# Patient Record
Sex: FEMALE | Race: BLACK OR AFRICAN AMERICAN
[De-identification: names, ages, dates, MRNs, and addresses within clinical notes are randomized per-mention and may not be internally consistent; named-entity substitution may affect disease eponyms.]

---

## 2018-01-11 ENCOUNTER — HOSPITAL ENCOUNTER (OUTPATIENT)
Dept: HOSPITAL 62 - RAD | Age: 33
End: 2018-01-11
Attending: CLINIC/CENTER
Payer: MEDICARE

## 2018-01-11 DIAGNOSIS — M54.5: Primary | ICD-10-CM

## 2018-01-11 PROCEDURE — 72220 X-RAY EXAM SACRUM TAILBONE: CPT

## 2018-01-11 NOTE — RADIOLOGY REPORT (SQ)
EXAM DESCRIPTION:  SACRUM AND COCCYX



COMPLETED DATE/TIME:  1/11/2018 4:26 pm



REASON FOR STUDY:  FALL WITH PAIN ROM ISSUES LOW BACK



COMPARISON:  None.



NUMBER OF VIEWS:  Three views.



TECHNIQUE:  AP, lateral, and tilt views of the sacrum and coccyx.



LIMITATIONS:  None.



FINDINGS:  MINERALIZATION: Normal.

BONES: On the lateral view, there is an acute fracture through the distal sacrum near the sacrococcyg
eal junction.  This is nondisplaced nonangulated.

SOFT TISSUES: No soft tissue swelling.  No foreign body.

OTHER: No other significant finding.



IMPRESSION:  Lateral view demonstrates an acute fracture through the distal sacrum near the sacrococc
ygeal junction.  This is non angulated, nondisplaced.



TECHNICAL DOCUMENTATION:  JOB ID:  [id number]

 2011 [employer]- All Rights Reserved

## 2018-03-26 ENCOUNTER — HOSPITAL ENCOUNTER (OUTPATIENT)
Dept: HOSPITAL 62 - RAD | Age: 33
End: 2018-03-26
Attending: CLINIC/CENTER
Payer: MEDICARE

## 2018-03-26 DIAGNOSIS — R05: ICD-10-CM

## 2018-03-26 DIAGNOSIS — J32.9: Primary | ICD-10-CM

## 2018-03-26 DIAGNOSIS — J40: ICD-10-CM

## 2018-03-26 PROCEDURE — 70220 X-RAY EXAM OF SINUSES: CPT

## 2018-03-26 PROCEDURE — 71046 X-RAY EXAM CHEST 2 VIEWS: CPT

## 2018-03-26 NOTE — RADIOLOGY REPORT (SQ)
EXAM DESCRIPTION:  CHEST PA/LAT



COMPLETED DATE/TIME:  3/26/2018 12:32 pm



REASON FOR STUDY:  PERSISTENT COUGH/RHINOSINUSITIS/BRONCHITIS



COMPARISON:  None.



EXAM PARAMETERS:  NUMBER OF VIEWS: two views

TECHNIQUE: Digital Frontal and Lateral radiographic views of the chest acquired.

RADIATION DOSE: NA

LIMITATIONS: none



FINDINGS:  LUNGS AND PLEURA: No opacities, masses or pneumothorax. No pleural effusion.

MEDIASTINUM AND HILAR STRUCTURES: No masses or contour abnormalities.

HEART AND VASCULAR STRUCTURES: Heart normal size.  No evidence for failure.

BONES: No acute findings.

HARDWARE: None in the chest.

OTHER: No other significant finding.



IMPRESSION:  NO SIGNIFICANT RADIOGRAPHIC FINDING IN THE CHEST.



TECHNICAL DOCUMENTATION:  JOB ID:  [id number]

 2011 [employer]- All Rights Reserved



Reading location - IP/workstation name: [email or web address]

## 2018-03-26 NOTE — RADIOLOGY REPORT (SQ)
EXAM DESCRIPTION:  PARANASAL SINUSES



COMPLETED DATE/TIME:  3/26/2018 12:32 pm



REASON FOR STUDY:  RHINOSINUSITIS



COMPARISON:  None.



NUMBER OF VIEWS:  Three views.



TECHNIQUE:  Images of the paranasal sinuses acquired.



LIMITATIONS:  None.



FINDINGS:  ORBITS: No fracture. No foreign body.

SINUSES: No mucosal thickening. No air fluid levels.

FACIAL BONES: No fracture.

OTHER: No other significant finding.



IMPRESSION:  NO FOREIGN BODY OR FRACTURE.  NO PLAIN RADIOGRAPHIC EVIDENCE FOR SINUS DISEASE.



TECHNICAL DOCUMENTATION:  JOB ID:  [id number]

 2011 [employer]- All Rights Reserved



Reading location - IP/workstation name: [email or web address]

## 2018-11-19 ENCOUNTER — HOSPITAL ENCOUNTER (OUTPATIENT)
Dept: HOSPITAL 62 - LC | Age: 33
Setting detail: OBSERVATION
LOS: 1 days | Discharge: HOME | End: 2018-11-20
Attending: OBSTETRICS & GYNECOLOGY | Admitting: OBSTETRICS & GYNECOLOGY
Payer: COMMERCIAL

## 2018-11-19 DIAGNOSIS — Z87.891: ICD-10-CM

## 2018-11-19 DIAGNOSIS — Z04.1: Primary | ICD-10-CM

## 2018-11-19 DIAGNOSIS — O47.03: ICD-10-CM

## 2018-11-19 DIAGNOSIS — Z3A.35: ICD-10-CM

## 2018-11-19 LAB
APPEARANCE UR: CLEAR
APTT PPP: YELLOW S
BARBITURATES UR QL SCN: NEGATIVE
BILIRUB UR QL STRIP: NEGATIVE
GLUCOSE UR STRIP-MCNC: NEGATIVE MG/DL
KETONES UR STRIP-MCNC: NEGATIVE MG/DL
METHADONE UR QL SCN: NEGATIVE
NITRITE UR QL STRIP: NEGATIVE
PCP UR QL SCN: NEGATIVE
PH UR STRIP: 6 [PH] (ref 5–9)
PROT UR STRIP-MCNC: NEGATIVE MG/DL
SP GR UR STRIP: 1.01
URINE AMPHETAMINES SCREEN: NEGATIVE
URINE BENZODIAZEPINES SCREEN: NEGATIVE
URINE COCAINE SCREEN: NEGATIVE
URINE MARIJUANA (THC) SCREEN: (no result)
UROBILINOGEN UR-MCNC: NEGATIVE MG/DL (ref ?–2)

## 2018-11-19 PROCEDURE — 81001 URINALYSIS AUTO W/SCOPE: CPT

## 2018-11-19 PROCEDURE — 4A0HXCZ MEASUREMENT OF PRODUCTS OF CONCEPTION, CARDIAC RATE, EXTERNAL APPROACH: ICD-10-PCS | Performed by: OBSTETRICS & GYNECOLOGY

## 2018-11-19 PROCEDURE — 80307 DRUG TEST PRSMV CHEM ANLYZR: CPT

## 2018-11-19 PROCEDURE — 85025 COMPLETE CBC W/AUTO DIFF WBC: CPT

## 2018-11-19 PROCEDURE — 59025 FETAL NON-STRESS TEST: CPT

## 2018-11-19 PROCEDURE — 86900 BLOOD TYPING SEROLOGIC ABO: CPT

## 2018-11-19 PROCEDURE — 36415 COLL VENOUS BLD VENIPUNCTURE: CPT

## 2018-11-19 PROCEDURE — 84112 EVAL AMNIOTIC FLUID PROTEIN: CPT

## 2018-11-19 PROCEDURE — 86901 BLOOD TYPING SEROLOGIC RH(D): CPT

## 2018-11-19 PROCEDURE — 86850 RBC ANTIBODY SCREEN: CPT

## 2018-11-19 PROCEDURE — 86592 SYPHILIS TEST NON-TREP QUAL: CPT

## 2018-11-20 LAB
ABSOLUTE LYMPHOCYTES# (MANUAL): 1.8 10^3/UL (ref 0.5–4.7)
ABSOLUTE MONOCYTES # (MANUAL): 0.4 10^3/UL (ref 0.1–1.4)
ABSOLUTE NEUTROPHILS# (MANUAL): 7.9 10^3/UL (ref 1.7–8.2)
ACANTHOCYTES BLD QL SMEAR: (no result)
ADD MANUAL DIFF: YES
BASOPHILS NFR BLD MANUAL: 0 % (ref 0–2)
EOSINOPHIL NFR BLD MANUAL: 0 % (ref 0–6)
ERYTHROCYTE [DISTWIDTH] IN BLOOD BY AUTOMATED COUNT: 13.9 % (ref 11.5–14)
HCT VFR BLD CALC: 28.6 % (ref 36–47)
HELMET CELLS BLD QL SMEAR: SLIGHT
HGB BLD-MCNC: 9.9 G/DL (ref 12–15.5)
MCH RBC QN AUTO: 29.6 PG (ref 27–33.4)
MCHC RBC AUTO-ENTMCNC: 34.4 G/DL (ref 32–36)
MCV RBC AUTO: 86 FL (ref 80–97)
MONOCYTES % (MANUAL): 4 % (ref 3–13)
OVALOCYTES BLD QL SMEAR: (no result)
PLATELET # BLD: 186 10^3/UL (ref 150–450)
PLATELET COMMENT: ADEQUATE
POIKILOCYTOSIS BLD QL SMEAR: (no result)
RBC # BLD AUTO: 3.33 10^6/UL (ref 3.72–5.28)
SEGMENTED NEUTROPHILS % (MAN): 78 % (ref 42–78)
TOTAL CELLS COUNTED BLD: 100
TOXIC GRANULES BLD QL SMEAR: SLIGHT
VARIANT LYMPHS NFR BLD MANUAL: 17 % (ref 13–45)
WBC # BLD AUTO: 10.1 10^3/UL (ref 4–10.5)
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
## 2018-11-27 ENCOUNTER — HOSPITAL ENCOUNTER (INPATIENT)
Dept: HOSPITAL 62 - LC | Age: 33
LOS: 2 days | Discharge: HOME | End: 2018-11-29
Attending: OBSTETRICS & GYNECOLOGY | Admitting: OBSTETRICS & GYNECOLOGY
Payer: MEDICARE

## 2018-11-27 DIAGNOSIS — Z23: ICD-10-CM

## 2018-11-27 DIAGNOSIS — Z3A.36: ICD-10-CM

## 2018-11-27 DIAGNOSIS — O45.8X3: ICD-10-CM

## 2018-11-27 DIAGNOSIS — D62: ICD-10-CM

## 2018-11-27 DIAGNOSIS — Z87.891: ICD-10-CM

## 2018-11-27 DIAGNOSIS — F12.90: ICD-10-CM

## 2018-11-27 LAB
APPEARANCE UR: CLEAR
APTT PPP: (no result) S
BARBITURATES UR QL SCN: NEGATIVE
BILIRUB UR QL STRIP: NEGATIVE
ERYTHROCYTE [DISTWIDTH] IN BLOOD BY AUTOMATED COUNT: 14.1 % (ref 11.5–14)
ERYTHROCYTE [DISTWIDTH] IN BLOOD BY AUTOMATED COUNT: 14.2 % (ref 11.5–14)
ERYTHROCYTE [DISTWIDTH] IN BLOOD BY AUTOMATED COUNT: 14.2 % (ref 11.5–14)
GLUCOSE UR STRIP-MCNC: NEGATIVE MG/DL
HCT VFR BLD CALC: 17.4 % (ref 36–47)
HCT VFR BLD CALC: 17.9 % (ref 36–47)
HCT VFR BLD CALC: 32.8 % (ref 36–47)
HGB BLD-MCNC: 11 G/DL (ref 12–15.5)
HGB BLD-MCNC: 5.8 G/DL (ref 12–15.5)
HGB BLD-MCNC: 6.1 G/DL (ref 12–15.5)
KETONES UR STRIP-MCNC: (no result) MG/DL
MCH RBC QN AUTO: 29.1 PG (ref 27–33.4)
MCH RBC QN AUTO: 29.3 PG (ref 27–33.4)
MCH RBC QN AUTO: 29.3 PG (ref 27–33.4)
MCHC RBC AUTO-ENTMCNC: 33.5 G/DL (ref 32–36)
MCHC RBC AUTO-ENTMCNC: 33.6 G/DL (ref 32–36)
MCHC RBC AUTO-ENTMCNC: 34.1 G/DL (ref 32–36)
MCV RBC AUTO: 86 FL (ref 80–97)
MCV RBC AUTO: 87 FL (ref 80–97)
MCV RBC AUTO: 87 FL (ref 80–97)
METHADONE UR QL SCN: NEGATIVE
NITRITE UR QL STRIP: NEGATIVE
PCP UR QL SCN: NEGATIVE
PH UR STRIP: 6 [PH] (ref 5–9)
PLATELET # BLD: 139 10^3/UL (ref 150–450)
PLATELET # BLD: 162 10^3/UL (ref 150–450)
PLATELET # BLD: 221 10^3/UL (ref 150–450)
PROT UR STRIP-MCNC: NEGATIVE MG/DL
RBC # BLD AUTO: 1.99 10^6/UL (ref 3.72–5.28)
RBC # BLD AUTO: 2.09 10^6/UL (ref 3.72–5.28)
RBC # BLD AUTO: 3.78 10^6/UL (ref 3.72–5.28)
SP GR UR STRIP: 1.02
URINE AMPHETAMINES SCREEN: NEGATIVE
URINE BENZODIAZEPINES SCREEN: NEGATIVE
URINE COCAINE SCREEN: NEGATIVE
URINE MARIJUANA (THC) SCREEN: (no result)
UROBILINOGEN UR-MCNC: NEGATIVE MG/DL (ref ?–2)
WBC # BLD AUTO: 16.9 10^3/UL (ref 4–10.5)
WBC # BLD AUTO: 16.9 10^3/UL (ref 4–10.5)
WBC # BLD AUTO: 17.1 10^3/UL (ref 4–10.5)

## 2018-11-27 PROCEDURE — P9016 RBC LEUKOCYTES REDUCED: HCPCS

## 2018-11-27 PROCEDURE — 86920 COMPATIBILITY TEST SPIN: CPT

## 2018-11-27 PROCEDURE — 90715 TDAP VACCINE 7 YRS/> IM: CPT

## 2018-11-27 PROCEDURE — G0008 ADMIN INFLUENZA VIRUS VAC: HCPCS

## 2018-11-27 PROCEDURE — 80349 CANNABINOIDS NATURAL: CPT

## 2018-11-27 PROCEDURE — 36430 TRANSFUSION BLD/BLD COMPNT: CPT

## 2018-11-27 PROCEDURE — 90471 IMMUNIZATION ADMIN: CPT

## 2018-11-27 PROCEDURE — 86901 BLOOD TYPING SEROLOGIC RH(D): CPT

## 2018-11-27 PROCEDURE — 80307 DRUG TEST PRSMV CHEM ANLYZR: CPT

## 2018-11-27 PROCEDURE — 90686 IIV4 VACC NO PRSV 0.5 ML IM: CPT

## 2018-11-27 PROCEDURE — 30233N1 TRANSFUSION OF NONAUTOLOGOUS RED BLOOD CELLS INTO PERIPHERAL VEIN, PERCUTANEOUS APPROACH: ICD-10-PCS | Performed by: OBSTETRICS & GYNECOLOGY

## 2018-11-27 PROCEDURE — 86592 SYPHILIS TEST NON-TREP QUAL: CPT

## 2018-11-27 PROCEDURE — 4A1HXCZ MONITORING OF PRODUCTS OF CONCEPTION, CARDIAC RATE, EXTERNAL APPROACH: ICD-10-PCS | Performed by: OBSTETRICS & GYNECOLOGY

## 2018-11-27 PROCEDURE — G0480 DRUG TEST DEF 1-7 CLASSES: HCPCS

## 2018-11-27 PROCEDURE — 86850 RBC ANTIBODY SCREEN: CPT

## 2018-11-27 PROCEDURE — 81001 URINALYSIS AUTO W/SCOPE: CPT

## 2018-11-27 PROCEDURE — 87086 URINE CULTURE/COLONY COUNT: CPT

## 2018-11-27 PROCEDURE — 86900 BLOOD TYPING SEROLOGIC ABO: CPT

## 2018-11-27 PROCEDURE — 85027 COMPLETE CBC AUTOMATED: CPT

## 2018-11-27 PROCEDURE — 36415 COLL VENOUS BLD VENIPUNCTURE: CPT

## 2018-11-27 PROCEDURE — 87591 N.GONORRHOEAE DNA AMP PROB: CPT

## 2018-11-27 PROCEDURE — 87491 CHLMYD TRACH DNA AMP PROBE: CPT

## 2018-11-27 RX ADMIN — IBUPROFEN SCH: 800 TABLET, FILM COATED ORAL at 14:03

## 2018-11-27 RX ADMIN — FAMOTIDINE SCH MG: 20 TABLET, FILM COATED ORAL at 22:00

## 2018-11-27 RX ADMIN — IBUPROFEN SCH MG: 800 TABLET, FILM COATED ORAL at 14:02

## 2018-11-27 RX ADMIN — FERROUS SULFATE TAB 325 MG (65 MG ELEMENTAL FE) SCH MG: 325 (65 FE) TAB at 17:02

## 2018-11-27 RX ADMIN — FAMOTIDINE SCH: 20 TABLET, FILM COATED ORAL at 17:04

## 2018-11-27 RX ADMIN — IBUPROFEN SCH MG: 800 TABLET, FILM COATED ORAL at 21:59

## 2018-11-27 RX ADMIN — Medication SCH: at 17:05

## 2018-11-27 RX ADMIN — SENNOSIDES, DOCUSATE SODIUM SCH: 50; 8.6 TABLET, FILM COATED ORAL at 17:05

## 2018-11-27 RX ADMIN — DOCUSATE SODIUM SCH MG: 100 CAPSULE, LIQUID FILLED ORAL at 17:01

## 2018-11-27 RX ADMIN — DOCUSATE SODIUM SCH: 100 CAPSULE, LIQUID FILLED ORAL at 17:04

## 2018-11-27 RX ADMIN — FERROUS SULFATE TAB 325 MG (65 MG ELEMENTAL FE) SCH: 325 (65 FE) TAB at 17:04

## 2018-11-27 NOTE — DELIVERY SUMMARY
=================================================================

Del Sum A-C

=================================================================

Datetime Report Generated by CPN: 2018 07:31

   

   

=================================================================

DELIVERY PERSONNEL

=================================================================

   

DELIVERY PERSONNEL:  [id number]

Delivery Doctor::  Sandee Dukes MD

Labor and Delivery Nurse::  Monique Cano RN

Scrub Tech/CNA:  Toño Ahn CNA

Scrub Tech/CNA:  Olu Do CNA

Additional Personnel: :  Julianna Hill, RN

   

=================================================================

MATERNAL INFORMATION

=================================================================

   

Delivery Anesthesia:  None

Medications After Delivery:  Pitocin Drip 20 Units/1000ml NSS

Estimated  Blood Loss (ml):  300

Maternal Complications:  Precipitous Labor (<3hrs); Abruptio Placenta

Complication Details:  partial placental abruption

Provider Comments:  dark clotted blood with passage of placenta c/w a

   possible partial abruption

   

=================================================================

LABOR SUMMARY

=================================================================

   

EDC:  2018 00:00

No. Babies in Womb:  1

 Attempted:  No

Labor Anesthesia:  None

   

=================================================================

LABOR INFORMATION

=================================================================

   

Reason for Induction:  Not Applicable

Onset of Labor:  2018 03:15

Complete Dilatation:  2018 05:43

Oxytocin:  Augmentation

Group B Beta Strep:  unknown

Antibiotics # of Doses:  1

Antibiotics Time of Last Dose:  0316

Name of Antibiotic Given:  Penicillin

Steroids Given:  None

Reason Steroids Not Administered:  Not Applicable

   

=================================================================

MEMBRANES

=================================================================

   

Membranes Rupture Method:  Artificial

Rupture of Membranes:  2018 03:15 (Annotations: Per Dr. Dukes

   sve assessment )

Length of Rupture (hr):  2.52

Amniotic Fluid Color:  Clear

Amniotic Fluid Amount:  Small

Amniotic Fluid Odor:  Normal

   

=================================================================

STAGES OF LABOR

=================================================================

   

Stage 1 hr:  2

Stage 1 min:  28

Stage 2 hr:  0

Stage 2 min:  3

Stage 3 hr:  0

Stage 3 min:  4

Total Time in Labor hr:  2

Total Time in Labor min:  35

   

=================================================================

VAGINAL DELIVERY

=================================================================

   

Episiotomy:  None

Laceration #1:  None

Laceration Extension #1:  N/A

Laceration Repair:  Not Applicable

Sponge Count Correct:  N/A

Sharps Count Correct:  N/A

   

=================================================================

CSECTION DELIVERY

=================================================================

   

Primary Indication:  N/A

Secondary Indication:  N/A

CSection Incidence:  N/A

Labor:  N/A

Elective:  N/A

CSection Incision:  N/A

   

=================================================================

BABY A INFORMATION

=================================================================

   

Infant Delivery Date/Time:  2018 05:46

Method of Delivery:  Vaginal

Born in Route :  No

:  N/A

Forceps:  N/A

Vacuum Extraction:  N/A

Shoulder Dystocia :  No

   

=================================================================

PRESENTATION/POSITION BABY A

=================================================================

   

Presentation:  Cephalic

Cephalic Presentation:  Vertex

Vertex Position:  Right Occipital Anterior

Breech Presentation:  N/A

   

=================================================================

PLACENTA INFORMATION BABY A

=================================================================

   

Placenta Delivery Time :  2018 05:50

Placenta Method of Delivery:  Spontaneous

Placenta Status:  Delivered

   

=================================================================

APGAR SCORES BABY A

=================================================================

   

Heart Rate 1 min:  >100 bpm

Resp Effort 1 min:  Good Cry

Reflex Irritability 1 min:  Cough or Sneeze or Pulls Away

Muscle Tone 1 min:  Active Motion

Color 1 min:  Blue/Pale

Resuscitation Effort 1 min:  Tactile Stimulation

APGAR SCORE 1 MIN:  8

Heart Rate 5 min:  >100 bpm

Resp Effort 5 min:  Good Cry

Reflex Irritability 5 min:  Cough or Sneeze or Pulls Away

Muscle Tone 5 min:  Active Motion

Color 5 min:  Body Pink, Extremities Blue

APGAR SCORE 5 MIN:  9

   

=================================================================

INFANT INFORMATION BABY A

=================================================================

   

Gestational Age at Delivery:  36.5

Gestational Status:  Late - 34- 36.6 Weeks

Infant Outcome :  Liveborn

Infant Condition :  Stable

Infant Sex:  Male

   

=================================================================

IDENTIFICATION BABY A

=================================================================

   

Infant Verification Date/Time:  2018 06:00

ID Band Number:  [id number]

Mother's Name Verified:  Yes

Infant Medical Record Number:  [id number]

RN Verifying Infant:  MGraham Cano RN

Additional Verifying Personnel:  RGraham Ahn CNA

   

=================================================================

WEIGHT/LENGTH BABY A

=================================================================

   

Infant Birthweight (gm):  2520

Infant Weight (lb):  5

Infant Weight (oz):  9

Infant Length (in):  18.50

Infant Length (cm):  46.99

   

=================================================================

CORD INFORMATION BABY A

=================================================================

   

No. Cord Vessels:  3

Nuchal Cord :  N/A

Cord Blood Taken:  Yes-For Storage (Mom's Blood type +)

Infant Suction:  None

   

=================================================================

ASSESSMENT BABY A

=================================================================

   

Infant Complications:  Multiple Variable Decels

Physical Findings at Delivery:  Within Normal Limits

Infant Respirations:  Appears Normal

Skin to Skin:  No

Neonatologist/ALS Called :  No

Infant Care By:  MARIANNE Medrano RN

Transferred To:  Remains with Mother

   

=================================================================

BABY B INFORMATION

=================================================================

   

 :  N/A

   

=================================================================

SIGNATURES

=================================================================

   

Signature:  Electronically signed by Sandee Dukes MD (ANDDO) on

   2018 at 05:55  with User ID: [email or web address]

## 2018-11-27 NOTE — WARNING SIGNS IN BABIES
=================================================================

VOD Warning Signs

=================================================================

Datetime Report Generated by N: 11/27/2018 06:35

   

VOD#608 -Warning Signs in Babies:  Viewed with Parent(s)/Family   

   (11/27/2018 06:20:Monique Cano RN)
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
## 2018-11-28 LAB
ERYTHROCYTE [DISTWIDTH] IN BLOOD BY AUTOMATED COUNT: 14.1 % (ref 11.5–14)
HCT VFR BLD CALC: 23.8 % (ref 36–47)
HGB BLD-MCNC: 8 G/DL (ref 12–15.5)
MCH RBC QN AUTO: 28.7 PG (ref 27–33.4)
MCHC RBC AUTO-ENTMCNC: 33.7 G/DL (ref 32–36)
MCV RBC AUTO: 85 FL (ref 80–97)
PLATELET # BLD: 127 10^3/UL (ref 150–450)
RBC # BLD AUTO: 2.79 10^6/UL (ref 3.72–5.28)
WBC # BLD AUTO: 13.2 10^3/UL (ref 4–10.5)

## 2018-11-28 PROCEDURE — 30233N1 TRANSFUSION OF NONAUTOLOGOUS RED BLOOD CELLS INTO PERIPHERAL VEIN, PERCUTANEOUS APPROACH: ICD-10-PCS | Performed by: OBSTETRICS & GYNECOLOGY

## 2018-11-28 RX ADMIN — FAMOTIDINE SCH MG: 20 TABLET, FILM COATED ORAL at 10:08

## 2018-11-28 RX ADMIN — IBUPROFEN SCH MG: 800 TABLET, FILM COATED ORAL at 21:23

## 2018-11-28 RX ADMIN — DOCUSATE SODIUM SCH MG: 100 CAPSULE, LIQUID FILLED ORAL at 18:14

## 2018-11-28 RX ADMIN — IBUPROFEN SCH: 800 TABLET, FILM COATED ORAL at 14:40

## 2018-11-28 RX ADMIN — IBUPROFEN SCH MG: 800 TABLET, FILM COATED ORAL at 05:21

## 2018-11-28 RX ADMIN — FAMOTIDINE SCH MG: 20 TABLET, FILM COATED ORAL at 21:23

## 2018-11-28 RX ADMIN — FERROUS SULFATE TAB 325 MG (65 MG ELEMENTAL FE) SCH MG: 325 (65 FE) TAB at 18:13

## 2018-11-28 RX ADMIN — DOCUSATE SODIUM SCH MG: 100 CAPSULE, LIQUID FILLED ORAL at 10:08

## 2018-11-28 RX ADMIN — ACETAMINOPHEN AND CODEINE PHOSPHATE PRN EACH: 300; 30 TABLET ORAL at 12:35

## 2018-11-28 RX ADMIN — SENNOSIDES, DOCUSATE SODIUM SCH EACH: 50; 8.6 TABLET, FILM COATED ORAL at 10:08

## 2018-11-28 RX ADMIN — Medication SCH CAP: at 10:08

## 2018-11-28 RX ADMIN — ACETAMINOPHEN AND CODEINE PHOSPHATE PRN EACH: 300; 30 TABLET ORAL at 18:16

## 2018-11-28 RX ADMIN — FERROUS SULFATE TAB 325 MG (65 MG ELEMENTAL FE) SCH MG: 325 (65 FE) TAB at 10:08

## 2018-11-28 RX ADMIN — ACETAMINOPHEN AND CODEINE PHOSPHATE PRN EACH: 300; 30 TABLET ORAL at 06:08

## 2018-11-28 NOTE — PDOC PROGRESS REPORT
Subjective-OB


Progress Note for:: 18


Subjective: 





34yo G4 now P4 s/p  ppd1.  Pt. ambulating and voiding without difficulties. 

Reports dizziness, lightheadedness and shortness of breath yesterday but none 

since blood transfusion.  Pain well controlled with meds. Denies concerns 

today. 





Physical Exam (OB)


Vital Signs: 


 











Temp Pulse Resp BP Pulse Ox


 


 97.5 F   62   15   99/55 L  100 


 


 18 07:46  18 07:46  18 07:46  18 07:46  18 07:46








 Intake & Output











 18





 06:59 06:59 06:59


 


Intake Total  320 


 


Balance  320 


 


Weight  55.9 kg 














- General


General Appearance: Appears well


In distress: None





- PIH/Pre-Eclampsia


Headache: Absent


Epigastric Pain: No


Visual Changes: No





- Episiotomy/Laceration


Site Condition: N/A





- Lochia


Lochia Amount: Scant < 10 ml


Lochia Color: Rubra/Red





- Abdomen


Description: Soft


Hernia Present: No


Fundal Description: Firm, Midline


Fundal Height: u/3 - u/4





- Respiratory


Respiratory Status: No respiratory distress





- Extremities


Upper extremity: Normal inspection


Lower extremities: Normal inspection





- Neurological


Cognition: Normal


Orientation: AAOx4





- Psychological


Associated symptoms: Normal affect, Normal mood





Objective-Diagnostic


Laboratory: 


 





 18 07:14 





 











  18





  03:24 17:05 21:15


 


WBC   16.9 H  17.1 H


 


RBC   2.09 L  1.99 L


 


Hgb   6.1 L D  5.8 L


 


Hct   17.9 L  17.4 L


 


MCV   86  87


 


MCH   29.3  29.3


 


MCHC   34.1  33.5


 


RDW   14.2 H  14.1 H


 


Plt Count   139 L  162


 


Blood Type  O POSITIVE  


 


Antibody Screen  NEGATIVE  














  18





  07:14


 


WBC  13.2 H


 


RBC  2.79 L


 


Hgb  8.0 L D


 


Hct  23.8 L


 


MCV  85


 


MCH  28.7


 


MCHC  33.7


 


RDW  14.1 H


 


Plt Count  127 L


 


Blood Type 


 


Antibody Screen 














Assessment and Plan(PN)





- Assessment and Plan


(1) Vaginal delivery


Is this a current diagnosis for this admission?: Yes   


Plan: 


Routine pp care, Monitor for s/s of infection








(2) Postpartum hemorrhage


Qualifiers: 


   Postpartum hemorrhage type: unspecified   Qualified Code(s): O72.1 - Other 

immediate postpartum hemorrhage   


Is this a current diagnosis for this admission?: Yes   


Plan: 


QBL was 794, bleeding stable since transfer from L&D. continue to monitor








(3) Acute blood loss anemia


Is this a current diagnosis for this admission?: Yes   


Plan: 


2 units PRBC transfused, increase dietary iron and feso4 BID








(4) Transfusion of blood during current hospitalisation


Is this a current diagnosis for this admission?: Yes   


Plan: 


H/H improved to 8.2/23.8 this am, continue to monitor








(5) Drug abuse during pregnancy


Is this a current diagnosis for this admission?: Yes   


Plan: 


discharge planning consult placed








- Time Spent with Patient


Time with patient: Less than 15 minutes


Smoking Education Provided: Over 3 minutes


Medications reviewed and adjusted accordingly: Yes





- Disposition


Anticipated Discharge: Home


Within: within 24 hours

## 2018-11-29 VITALS — DIASTOLIC BLOOD PRESSURE: 70 MMHG | SYSTOLIC BLOOD PRESSURE: 125 MMHG

## 2018-11-29 PROCEDURE — 3E02340 INTRODUCTION OF INFLUENZA VACCINE INTO MUSCLE, PERCUTANEOUS APPROACH: ICD-10-PCS | Performed by: OBSTETRICS & GYNECOLOGY

## 2018-11-29 RX ADMIN — ACETAMINOPHEN AND CODEINE PHOSPHATE PRN EACH: 300; 30 TABLET ORAL at 09:34

## 2018-11-29 RX ADMIN — Medication SCH CAP: at 09:31

## 2018-11-29 RX ADMIN — IBUPROFEN SCH MG: 800 TABLET, FILM COATED ORAL at 05:13

## 2018-11-29 RX ADMIN — DOCUSATE SODIUM SCH MG: 100 CAPSULE, LIQUID FILLED ORAL at 09:31

## 2018-11-29 RX ADMIN — SENNOSIDES, DOCUSATE SODIUM SCH EACH: 50; 8.6 TABLET, FILM COATED ORAL at 09:31

## 2018-11-29 RX ADMIN — ACETAMINOPHEN AND CODEINE PHOSPHATE PRN EACH: 300; 30 TABLET ORAL at 02:28

## 2018-11-29 RX ADMIN — FAMOTIDINE SCH MG: 20 TABLET, FILM COATED ORAL at 09:31

## 2018-11-29 RX ADMIN — FERROUS SULFATE TAB 325 MG (65 MG ELEMENTAL FE) SCH MG: 325 (65 FE) TAB at 09:31

## 2018-11-29 NOTE — PDOC PROGRESS REPORT
Subjective-OB


Progress Note for:: 11/29/18


Subjective: 





reports bleeding slowing, complaining of right side pain. no CVAT. pt states it 

is possibly gas, RN notified and simethicone ordered.





Physical Exam (OB)


Vital Signs: 


 











Temp Pulse Resp BP Pulse Ox


 


 97.9 F   59 L  16   125/70   100 


 


 11/29/18 07:38  11/29/18 07:38  11/29/18 07:38  11/29/18 07:38  11/29/18 07:38








 Intake & Output











 11/28/18 11/29/18 11/30/18





 06:59 06:59 06:59


 


Intake Total 320 1250 


 


Balance 320 1250 


 


Weight 55.9 kg  














- Abdomen


Description: Soft


Hernia Present: No


Fundal Description: Firm, Midline


Fundal Height: u/3 - u/4





Objective-Diagnostic


Laboratory: 


 





 11/28/18 07:14 





 





11/27/18 07:30   Urine,Voided (Not Clean Catch)   Chlamydia trachomatis (GRETCHEN) - 

Final


11/27/18 07:30   Urine,Voided (Not Clean Catch)   Neisseria gonorrhoeae (GRETCHEN) - 

Final











Assessment and Plan(PN)





- Assessment and Plan


(1) Vaginal delivery


Is this a current diagnosis for this admission?: Yes   





- Time Spent with Patient


Smoking Education Provided: Over 3 minutes


Medications reviewed and adjusted accordingly: Yes





- Disposition


Anticipated Discharge: Home


Within: Other - today

## 2018-11-29 NOTE — PDOC DISCHARGE SUMMARY
Final Diagnosis


Discharge Date: 11/29/18





- Final Diagnosis


(1) Vaginal delivery


Is this a current diagnosis for this admission?: Yes   





Discharge Data





- Discharge Medication


Prescriptions: 


Acetaminophen with Codeine [Tylenol #3 Tablet] 1 each PO Q4HP PRN #20 tablet


 PRN Reason: 


Ibuprofen [Motrin 800 mg Tablet] 800 mg PO Q8HP PRN #60 tablet


 PRN Reason: 


Home Medications: 








Docusate Sodium [Colace] 100 mg PO DAILY 11/19/18 


Pnv No.95/Ferrous Fum/Folic AC [Prenatal Vitamin Tablet] 1 each PO DAILY 11/19/ 18 


Ranitidine HCl [Zantac 150 mg Tablet] 150 mg PO BID 11/19/18 


Acetaminophen with Codeine [Tylenol #3 Tablet] 1 each PO Q4HP PRN #20 tablet 11/ 29/18 


Ferrous Sulfate [Feosol 325 mg Tablet] 325 mg PO BID  tablet 11/29/18 


Ibuprofen [Motrin 800 mg Tablet] 800 mg PO Q8HP PRN #60 tablet 11/29/18 


Prenatal Vit/Dha [Prenatal Multi + Dha Capsule] 1 cap PO DAILY  capsule 11/29/ 18 








Prenatal Procedures: NST


Intrapartum Procedure(s): Spontaneous Vaginal Delivery





- Diagnosis Test


Laboratory: 


 











Temp Pulse Resp BP Pulse Ox


 


 97.9 F   59 L  16   125/70   100 


 


 11/29/18 07:38  11/29/18 07:38  11/29/18 07:38  11/29/18 07:38  11/29/18 07:38








 











  11/27/18 11/27/18 11/27/18





  03:27 05:28 17:05


 


RBC  3.78   2.09 L


 


Hgb  11.0 L   6.1 L D


 


Hct  32.8 L   17.9 L


 


Urine Opiates Screen   NEGATIVE 














  11/27/18 11/28/18





  21:15 07:14


 


RBC  1.99 L  2.79 L


 


Hgb  5.8 L  8.0 L D


 


Hct  17.4 L  23.8 L


 


Urine Opiates Screen  














- Discharge information/Instructions


Discharge Activity: Balance Activity w/Rest, Pelvic Rest


Discharge Diet: Regular


Disposition: HOME, SELF-CARE


Follow up with: [facility]


in: 4, Weeks

## 2019-04-17 LAB
ALBUMIN SERPL-MCNC: 4.7 G/DL (ref 3.5–5)
ALP SERPL-CCNC: 71 U/L (ref 38–126)
ALT SERPL-CCNC: 37 U/L (ref 9–52)
ANION GAP SERPL CALC-SCNC: 8 MMOL/L (ref 5–19)
APPEARANCE UR: CLEAR
APTT PPP: YELLOW S
AST SERPL-CCNC: 34 U/L (ref 14–36)
BILIRUB DIRECT SERPL-MCNC: 0.3 MG/DL (ref 0–0.4)
BILIRUB SERPL-MCNC: 0.4 MG/DL (ref 0.2–1.3)
BILIRUB UR QL STRIP: NEGATIVE
BUN SERPL-MCNC: 16 MG/DL (ref 7–20)
CALCIUM: 10.3 MG/DL (ref 8.4–10.2)
CHLORIDE SERPL-SCNC: 109 MMOL/L (ref 98–107)
CO2 SERPL-SCNC: 24 MMOL/L (ref 22–30)
ERYTHROCYTE [DISTWIDTH] IN BLOOD BY AUTOMATED COUNT: 15.1 % (ref 11.5–14)
GLUCOSE SERPL-MCNC: 82 MG/DL (ref 75–110)
GLUCOSE UR STRIP-MCNC: NEGATIVE MG/DL
HCT VFR BLD CALC: 36.6 % (ref 36–47)
HGB BLD-MCNC: 12.3 G/DL (ref 12–15.5)
KETONES UR STRIP-MCNC: NEGATIVE MG/DL
MCH RBC QN AUTO: 28.9 PG (ref 27–33.4)
MCHC RBC AUTO-ENTMCNC: 33.7 G/DL (ref 32–36)
MCV RBC AUTO: 86 FL (ref 80–97)
NITRITE UR QL STRIP: NEGATIVE
PH UR STRIP: 6 [PH] (ref 5–9)
PLATELET # BLD: 308 10^3/UL (ref 150–450)
POTASSIUM SERPL-SCNC: 4.5 MMOL/L (ref 3.6–5)
PROT SERPL-MCNC: 7.8 G/DL (ref 6.3–8.2)
PROT UR STRIP-MCNC: NEGATIVE MG/DL
RBC # BLD AUTO: 4.27 10^6/UL (ref 3.72–5.28)
SODIUM SERPL-SCNC: 141.1 MMOL/L (ref 137–145)
SP GR UR STRIP: 1.02
UROBILINOGEN UR-MCNC: NEGATIVE MG/DL (ref ?–2)
WBC # BLD AUTO: 9.5 10^3/UL (ref 4–10.5)

## 2019-04-22 ENCOUNTER — HOSPITAL ENCOUNTER (OUTPATIENT)
Dept: HOSPITAL 62 - OROUT | Age: 34
Discharge: HOME | End: 2019-04-22
Attending: CLINIC/CENTER
Payer: MEDICARE

## 2019-04-22 VITALS — SYSTOLIC BLOOD PRESSURE: 144 MMHG | DIASTOLIC BLOOD PRESSURE: 83 MMHG

## 2019-04-22 DIAGNOSIS — L72.3: Primary | ICD-10-CM

## 2019-04-22 DIAGNOSIS — Z79.899: ICD-10-CM

## 2019-04-22 PROCEDURE — 85027 COMPLETE CBC AUTOMATED: CPT

## 2019-04-22 PROCEDURE — 0HBAXZZ EXCISION OF INGUINAL SKIN, EXTERNAL APPROACH: ICD-10-PCS | Performed by: CLINIC/CENTER

## 2019-04-22 PROCEDURE — 80053 COMPREHEN METABOLIC PANEL: CPT

## 2019-04-22 PROCEDURE — 81025 URINE PREGNANCY TEST: CPT

## 2019-04-22 PROCEDURE — 81001 URINALYSIS AUTO W/SCOPE: CPT

## 2019-04-22 PROCEDURE — 36415 COLL VENOUS BLD VENIPUNCTURE: CPT

## 2019-04-22 PROCEDURE — 88304 TISSUE EXAM BY PATHOLOGIST: CPT

## 2019-04-22 RX ADMIN — FENTANYL CITRATE PRN MCG: 50 INJECTION INTRAMUSCULAR; INTRAVENOUS at 10:14

## 2019-04-22 RX ADMIN — FENTANYL CITRATE PRN MCG: 50 INJECTION INTRAMUSCULAR; INTRAVENOUS at 10:25

## 2019-04-22 NOTE — OPERATIVE REPORT E
Operative Report



NAME: LUIS PARKS

MRN:  [id number]          : 1985 AGE:  33Y

DATE OF SURGERY: 2019              ROOM:



PREOPERATIVE DIAGNOSIS:

Recurrent left inguinal sebaceous cyst.



POSTOPERATIVE DIAGNOSIS:

Recurrent left inguinal sebaceous cyst.



OPERATION:

Excision of left inguinal sebaceous cyst.



SURGEON:

JADA DELEON M.D.



ANESTHESIA:

General.



PERTINENT HISTORY AND OPERATIVE FINDINGS:

This is a 33-year-old female who is known to have a left sebaceous cyst. 

We thought we were able to get it out in the office; however, there must

have been a core that we did not get out and so it did recur.  With this

in mind we decided to bring her to the operating room where we could have

general anesthesia and better chance of getting the remaining part of the

sebaceous cyst out.  At the time of surgery the sebaceous cyst was about

1.5 to 2 cm.  It was located in the left inguinal area.



OPERATIVE PROCEDURE:

The patient was brought into the OR, placed on the table in a supine

position and inducted under general anesthesia.  Following this she was

appropriately positioned and then prepped and draped in a sterile fashion.

A circumferential incision was made around the previous incision.  Then,

using sharp dissection the incision was taken down and around the

sebaceous cyst and the sebaceous cyst was gradually removed.  She did have

an injection of 1% Xylocaine with epinephrine.  After removing the

sebaceous cyst various bleeding points were cauterized using the Bovie. 

The subcutaneous tissue was then closed in 2 layers using 2-0 Dexon.  Skin

edges were brought together with subcuticular using 4-0 Prolene.  The

patient tolerated the procedure well.  She had dressings placed,

anesthesia was discontinued, and she was transferred to the recovery room

in satisfactory condition, negligible blood loss.



DICTATING PHYSICIAN:  JADA DELEON M.D.





[id number]                  DT: 2019    1023

PHY#: [id number]            DD: 2019    1011

ID:   [id number]           JOB#: [id number]       ACCT: [id number]



cc:JADA DELEON M.D.

>

## 2020-03-30 ENCOUNTER — HOSPITAL ENCOUNTER (EMERGENCY)
Dept: HOSPITAL 62 - ER | Age: 35
Discharge: HOME | End: 2020-03-30
Payer: MEDICARE

## 2020-03-30 VITALS — DIASTOLIC BLOOD PRESSURE: 94 MMHG | SYSTOLIC BLOOD PRESSURE: 189 MMHG

## 2020-03-30 DIAGNOSIS — S43.015A: Primary | ICD-10-CM

## 2020-03-30 DIAGNOSIS — I10: ICD-10-CM

## 2020-03-30 DIAGNOSIS — M25.512: ICD-10-CM
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
## 2020-03-30 PROCEDURE — 23650 CLTX SHO DSLC W/MNPJ WO ANES: CPT

## 2020-03-30 PROCEDURE — 96375 TX/PRO/DX INJ NEW DRUG ADDON: CPT

## 2020-03-30 PROCEDURE — 99283 EMERGENCY DEPT VISIT LOW MDM: CPT

## 2020-03-30 PROCEDURE — 73030 X-RAY EXAM OF SHOULDER: CPT

## 2020-03-30 PROCEDURE — 96374 THER/PROPH/DIAG INJ IV PUSH: CPT

## 2020-03-30 PROCEDURE — 73020 X-RAY EXAM OF SHOULDER: CPT

## 2020-03-30 NOTE — RADIOLOGY REPORT (SQ)
EXAM DESCRIPTION:  SHOULDER LEFT 1 VIEW



IMAGES COMPLETED DATE/TIME:  3/30/2020 12:22 pm



REASON FOR STUDY:  post reduction



COMPARISON:  None.



NUMBER OF VIEWS:  One view.



TECHNIQUE:  An AP view was obtained of the left shoulder.



LIMITATIONS:  None.



FINDINGS:  MINERALIZATION: Normal.

BONES: No acute fracture.  No worrisome bone lesions.

JOINTS: Dislocation has been reduced.

VISUALIZED LUNGS AND RIBS: No pneumothorax.  No rib fracture.

SOFT TISSUES: No radiopaque foreign body.

OTHER: No other significant finding.



IMPRESSION:  Successful reduction.  No fracture is seen.



TECHNICAL DOCUMENTATION:  JOB ID:  [id number]

 2011 [employer]- All Rights Reserved



Reading location - IP/workstation name: [email or web address]

## 2020-03-30 NOTE — ER DOCUMENT REPORT
HPI





- HPI


Time Seen by Provider: 03/30/20 10:23


Pain Level: 5





- CONSTITUTIONAL


Constitutional: DENIES: Fever, Chills





- REPRODUCTIVE


Reproductive: DENIES: Pregnant:





- MUSCULOSKELETAL


Musculoskeletal: REPORTS: Extremity pain





Past Medical History





- Social History


Smoking Status: Current Some Day Smoker


Chew tobacco use (# tins/day): No


Frequency of alcohol use: Occasional


Drug Abuse: None


Family History: Reviewed & Not Pertinent


Patient has suicidal ideation: No


Patient has homicidal ideation: No





- Past Medical History


Cardiac Medical History: Reports: Hx Hypertension


   Denies: Hx Coronary Artery Disease, Hx Heart Attack


Pulmonary Medical History: 


   Denies: Hx Asthma, Hx Bronchitis, Hx COPD, Hx Pneumonia


Neurological Medical History: Denies: Hx Cerebrovascular Accident, Hx Seizures


Renal/ Medical History: Denies: Hx Peritoneal Dialysis


Musculoskeletal Medical History: Denies Hx Arthritis





- Immunizations


Hx Diphtheria, Pertussis, Tetanus Vaccination: No





Vertical Provider Document





- INFECTION CONTROL


TRAVEL OUTSIDE OF THE [place] IN LAST 30 DAYS: No





Discharge





- Discharge


Clinical Impression: 


Fall


Qualifiers:


 Encounter type: initial encounter Qualified Code(s): W19.XXXA - Unspecified 

fall, initial encounter





Left shoulder pain


Qualifiers:


 Chronicity: acute Qualified Code(s): M25.512 - Pain in left shoulder





Condition: Stable


Referrals: 


TIMOTHY EDEN DO [Primary Care Provider] - Follow up as needed
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
## 2020-03-30 NOTE — RADIOLOGY REPORT (SQ)
EXAM DESCRIPTION:  SHOULDER LEFT 2 OR MORE VIEWS



IMAGES COMPLETED DATE/TIME:  3/30/2020 11:20 am



REASON FOR STUDY:  fall on left shoulder



COMPARISON:  None.



NUMBER OF VIEWS:  Two views.



TECHNIQUE:  AP and Y-view images acquired of the left shoulder.



LIMITATIONS:  None.



FINDINGS:  MINERALIZATION: Normal.

BONES: No acute fracture.  No worrisome bone lesions.

JOINTS: Anterior glenohumeral dislocation.

VISUALIZED LUNGS AND RIBS: No pneumothorax.  No rib fracture.

SOFT TISSUES: No radiopaque foreign body.

OTHER: No other significant finding.



IMPRESSION:  Anterior glenohumeral dislocation.



TECHNICAL DOCUMENTATION:  JOB ID:  [id number]

 2011 [employer]- All Rights Reserved



Reading location - IP/workstation name: [email or web address]

## 2020-03-30 NOTE — ER DOCUMENT REPORT
ED Medical Screen (RME)





- General


Chief Complaint: Shoulder Pain


Stated Complaint: FELL - LEFT SHOULDER PAIN


Time Seen by Provider: 03/30/20 10:23


Primary Care Provider: 


TIMOTHY EDEN DO [Primary Care Provider] - Follow up as needed


Notes: 





Patient is a 34-year-old female who presents to the emergency department with a 

chief complaint of left shoulder pain.  She was working out in the yard 

yesterday and ended up falling on her left side.  Patient states that she is 

unable to move her arm up.  She has not taken any medications to help with the 

pain.





Exam: Patient unable to move left shoulder.





I have greeted and performed a rapid initial assessment of this patient.  A 

comprehensive ED assessment and evaluation of the patient, analysis of test 

results and completion of medical decision making process will be conducted by 

an additional ED providers.


TRAVEL OUTSIDE OF THE [place] IN LAST 30 DAYS: No





- Related Data


Allergies/Adverse Reactions: 


                                        





No Known Allergies Allergy (Verified 04/27/19 07:31)


   








Home Medications: supposed to be taking bp meds but does not.





Past Medical History





- Social History


Chew tobacco use (# tins/day): No


Frequency of alcohol use: Occasional


Drug Abuse: None





- Past Medical History


Cardiac Medical History: Reports: Hx Hypertension


   Denies: Hx Coronary Artery Disease, Hx Heart Attack


Pulmonary Medical History: 


   Denies: Hx Asthma, Hx Bronchitis, Hx COPD, Hx Pneumonia


Neurological Medical History: Denies: Hx Cerebrovascular Accident, Hx Seizures


Renal/ Medical History: Denies: Hx Peritoneal Dialysis


Musculoskeltal Medical History: Denies Hx Arthritis





- Immunizations


Hx Diphtheria, Pertussis, Tetanus Vaccination: No





Physical Exam





- Vital signs


Vitals: 





                                        











Temp Pulse Resp BP Pulse Ox


 


 98.1 F   86   20   192/106 H  100 


 


 03/30/20 10:20  03/30/20 10:20  03/30/20 10:20  03/30/20 10:20  03/30/20 10:20














Course





- Vital Signs


Vital signs: 





                                        











Temp Pulse Resp BP Pulse Ox


 


 98.1 F   86   20   192/106 H  100 


 


 03/30/20 10:20  03/30/20 10:20  03/30/20 10:20  03/30/20 10:20  03/30/20 10:20














Doctor's Discharge





- Discharge


Clinical Impression: 


Fall


Qualifiers:


 Encounter type: initial encounter Qualified Code(s): W19.XXXA - Unspecified 

fall, initial encounter





Left shoulder pain


Qualifiers:


 Chronicity: acute Qualified Code(s): M25.512 - Pain in left shoulder





Condition: Stable


Referrals: 


TIMOTHY EDEN DO [Primary Care Provider] - Follow up as needed